# Patient Record
Sex: FEMALE | Race: WHITE | Employment: FULL TIME | URBAN - METROPOLITAN AREA
[De-identification: names, ages, dates, MRNs, and addresses within clinical notes are randomized per-mention and may not be internally consistent; named-entity substitution may affect disease eponyms.]

---

## 2024-08-21 ENCOUNTER — OFFICE VISIT (OUTPATIENT)
Dept: SURGERY | Age: 39
End: 2024-08-21
Payer: COMMERCIAL

## 2024-08-21 VITALS
OXYGEN SATURATION: 98 % | SYSTOLIC BLOOD PRESSURE: 115 MMHG | DIASTOLIC BLOOD PRESSURE: 75 MMHG | BODY MASS INDEX: 33.31 KG/M2 | WEIGHT: 181 LBS | HEART RATE: 80 BPM | TEMPERATURE: 98 F | HEIGHT: 62 IN

## 2024-08-21 DIAGNOSIS — N62 MACROMASTIA: Primary | ICD-10-CM

## 2024-08-21 PROCEDURE — 99204 OFFICE O/P NEW MOD 45 MIN: CPT | Performed by: SURGERY

## 2024-08-21 NOTE — PROGRESS NOTES
[facility]    CC: Symptomatic macromastia    REFERRING PHYSICIAN: Previous patient    HPI: This is a 38 y.o.  female who presents to clinic with desire for breast reduction.  Her pertinent breast history include the following:    Last Mammogram: None    Current bra size: 36M  Desired bra size: As small as possible  Pregnancies/miscarriages: 3 / 0  Breast feeding: no future plans    Breast Symptoms:    Macromastia Symptoms:  Upper back pain: Yes      Bra strap grooves: Yes      Wears supportive bras (>1 yr): Yes      Tried conservative measures (PT, MDs,etc): No      Intertrigo: Yes      Head/neck pain: Yes      Headaches: Yes      Paresthesias of hands/fingers: No      PMHx:   Past Medical History:   Diagnosis Date    Headache(784.0)      PSHx:   Past Surgical History:   Procedure Laterality Date    WISDOM TOOTH EXTRACTION     Csections x 3    ALLERGIES: No Known Allergies  SOCIAL:   FHx: Past history of breast CA: No   Past family members with breast reduction: No   Past family members with breast augmentation:No    Meds:   Current Outpatient Medications   Medication Sig Dispense Refill    topiramate (TOPAMAX) 50 MG tablet 150 mg      SUMAtriptan (IMITREX) 100 MG tablet   Only take twice week      PREVIFEM 0.25-35 MG-MCG per tablet        No current facility-administered medications for this visit.       ROS   Constitutional: Negative for chills and fever.   HENT: Negative for congestion, facial swelling, and voice change.    Eyes: Negative for photophobia and visual disturbance.   Respiratory: Negative for apnea, cough, chest tightness and shortness of breath.    Cardiovascular: Negative for chest pain and palpitations.   Gastrointestinal: Negative for dysphagia and early satiety.  Genitourinary: Negative for difficulty urinating, dysuria, flank pain, frequency and hematuria.   Musculoskeletal: See HPI.  Skin: Negative for color change, pallor and rash.   Endocrine: negative for

## 2025-01-07 NOTE — PROGRESS NOTES
[facility]    CC: Symptomatic macromastia    REFERRING PHYSICIAN: Previous patient    HPI: This is a 39 y.o.  female who presents to clinic with desire for breast reduction.     Since her last evaluation, she presents back for discussion with questions.    Her pertinent breast history include the following:    Last Mammogram: None    Current bra size: 36M  Desired bra size: As small as possible  Pregnancies/miscarriages: 3 / 0  Breast feeding: no future plans    Breast Symptoms:    Macromastia Symptoms:  Upper back pain: Yes      Bra strap grooves: Yes      Wears supportive bras (>1 yr): Yes      Tried conservative measures (PT, MDs,etc): No      Intertrigo: Yes      Head/neck pain: Yes      Headaches: Yes      Paresthesias of hands/fingers: No      PMHx:   Past Medical History:   Diagnosis Date    Headache(784.0)      PSHx:   Past Surgical History:   Procedure Laterality Date    WISDOM TOOTH EXTRACTION     Csections x 3    ALLERGIES: No Known Allergies  SOCIAL:   FHx: Past history of breast CA: No   Past family members with breast reduction: No   Past family members with breast augmentation:No    Meds:   Current Outpatient Medications   Medication Sig Dispense Refill    topiramate (TOPAMAX) 50 MG tablet 150 mg (Patient not taking: Reported on 2024)      SUMAtriptan (IMITREX) 100 MG tablet   Only take twice week      PREVIFEM 0.25-35 MG-MCG per tablet        No current facility-administered medications for this visit.       ROS   Constitutional: Negative for chills and fever.   HENT: Negative for congestion, facial swelling, and voice change.    Eyes: Negative for photophobia and visual disturbance.   Respiratory: Negative for apnea, cough, chest tightness and shortness of breath.    Cardiovascular: Negative for chest pain and palpitations.   Gastrointestinal: Negative for dysphagia and early satiety.  Genitourinary: Negative for difficulty urinating, dysuria, flank pain,

## 2025-01-08 ENCOUNTER — OFFICE VISIT (OUTPATIENT)
Dept: SURGERY | Age: 40
End: 2025-01-08
Payer: COMMERCIAL

## 2025-01-08 VITALS
DIASTOLIC BLOOD PRESSURE: 82 MMHG | HEIGHT: 62 IN | WEIGHT: 179.2 LBS | RESPIRATION RATE: 17 BRPM | HEART RATE: 82 BPM | BODY MASS INDEX: 32.97 KG/M2 | SYSTOLIC BLOOD PRESSURE: 117 MMHG

## 2025-01-08 DIAGNOSIS — N62 MACROMASTIA: Primary | ICD-10-CM

## 2025-01-08 PROCEDURE — 99213 OFFICE O/P EST LOW 20 MIN: CPT | Performed by: SURGERY
